# Patient Record
Sex: FEMALE | Race: WHITE | NOT HISPANIC OR LATINO | Employment: STUDENT | URBAN - METROPOLITAN AREA
[De-identification: names, ages, dates, MRNs, and addresses within clinical notes are randomized per-mention and may not be internally consistent; named-entity substitution may affect disease eponyms.]

---
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
## 2019-09-26 ENCOUNTER — TELEPHONE (OUTPATIENT)
Dept: OTHER | Facility: OTHER | Age: 19
End: 2019-09-26

## 2019-10-01 ENCOUNTER — OFFICE VISIT (OUTPATIENT)
Dept: FAMILY MEDICINE CLINIC | Facility: CLINIC | Age: 19
End: 2019-10-01
Payer: COMMERCIAL

## 2019-10-01 VITALS
SYSTOLIC BLOOD PRESSURE: 110 MMHG | HEIGHT: 63 IN | DIASTOLIC BLOOD PRESSURE: 68 MMHG | TEMPERATURE: 99 F | BODY MASS INDEX: 23.39 KG/M2 | OXYGEN SATURATION: 99 % | HEART RATE: 69 BPM | WEIGHT: 132 LBS

## 2019-10-01 DIAGNOSIS — N94.6 DYSMENORRHEA: ICD-10-CM

## 2019-10-01 DIAGNOSIS — N92.3 SPOTTING BETWEEN MENSES: Primary | ICD-10-CM

## 2019-10-01 PROCEDURE — 3008F BODY MASS INDEX DOCD: CPT | Performed by: FAMILY MEDICINE

## 2019-10-01 PROCEDURE — 99213 OFFICE O/P EST LOW 20 MIN: CPT | Performed by: FAMILY MEDICINE

## 2019-10-01 RX ORDER — NORETHINDRONE ACETATE AND ETHINYL ESTRADIOL 1MG-20(21)
1 KIT ORAL DAILY
Qty: 84 TABLET | Refills: 3 | Status: SHIPPED | OUTPATIENT
Start: 2019-10-01 | End: 2019-10-14 | Stop reason: SDUPTHER

## 2019-10-02 NOTE — PROGRESS NOTES
Assessment/Plan:      Diagnoses and all orders for this visit:    Spotting between menses  Comments:  change bcp    Dysmenorrhea  -     norethindrone-ethinyl estradiol (JUNEL FE 1/20) 1-20 MG-MCG per tablet; Take 1 tablet by mouth daily          Subjective:  Chief Complaint   Patient presents with    birth control     pt would like to talk about her birth control, pt states she's been bleeding with really bad cramps all month        Patient ID: Skip Baker is a 20 y o  female  Pt states she has been on same bcp for years but over the past month has had ongoing spotting/bleeding  And over the weekend had cramping  Would like to change bcp      Review of Systems   Constitutional: Negative  Negative for fatigue and fever  HENT: Negative  Eyes: Negative  Respiratory: Negative  Negative for cough  Cardiovascular: Negative  Gastrointestinal: Negative  Endocrine: Negative  Genitourinary: Positive for menstrual problem and vaginal bleeding  Negative for vaginal discharge and vaginal pain  Musculoskeletal: Negative  Skin: Negative  Allergic/Immunologic: Negative  Neurological: Negative  Psychiatric/Behavioral: Negative  The following portions of the patient's history were reviewed and updated as appropriate: allergies, current medications, past family history, past medical history, past social history, past surgical history and problem list     Objective:  Vitals:    10/01/19 1805   BP: 110/68   Pulse: 69   Temp: 99 °F (37 2 °C)   SpO2: 99%   Weight: 59 9 kg (132 lb)   Height: 5' 3" (1 6 m)      Physical Exam   Constitutional: She is oriented to person, place, and time  She appears well-developed and well-nourished  HENT:   Head: Normocephalic and atraumatic  Cardiovascular: Normal rate, regular rhythm, normal heart sounds and intact distal pulses  Pulmonary/Chest: Effort normal and breath sounds normal    Abdominal: Soft   Bowel sounds are normal    Neurological: She is alert and oriented to person, place, and time  Skin: Skin is warm and dry  Psychiatric: She has a normal mood and affect  Her behavior is normal  Judgment and thought content normal    Nursing note and vitals reviewed

## 2019-10-14 DIAGNOSIS — N94.6 DYSMENORRHEA: ICD-10-CM

## 2019-10-14 RX ORDER — NORETHINDRONE ACETATE AND ETHINYL ESTRADIOL 1MG-20(21)
1 KIT ORAL DAILY
Qty: 84 TABLET | Refills: 3 | Status: SHIPPED | OUTPATIENT
Start: 2019-10-14

## 2020-02-05 ENCOUNTER — OFFICE VISIT (OUTPATIENT)
Dept: FAMILY MEDICINE CLINIC | Facility: CLINIC | Age: 20
End: 2020-02-05
Payer: COMMERCIAL

## 2020-02-05 VITALS
BODY MASS INDEX: 23.79 KG/M2 | HEART RATE: 111 BPM | TEMPERATURE: 100.6 F | DIASTOLIC BLOOD PRESSURE: 64 MMHG | HEIGHT: 63 IN | SYSTOLIC BLOOD PRESSURE: 88 MMHG | WEIGHT: 134.25 LBS | OXYGEN SATURATION: 98 %

## 2020-02-05 DIAGNOSIS — J11.1 INFLUENZA: Primary | ICD-10-CM

## 2020-02-05 DIAGNOSIS — F41.9 ANXIETY: ICD-10-CM

## 2020-02-05 PROCEDURE — 99214 OFFICE O/P EST MOD 30 MIN: CPT | Performed by: FAMILY MEDICINE

## 2020-02-05 PROCEDURE — 1036F TOBACCO NON-USER: CPT | Performed by: FAMILY MEDICINE

## 2020-02-05 PROCEDURE — 3008F BODY MASS INDEX DOCD: CPT | Performed by: FAMILY MEDICINE

## 2020-02-05 RX ORDER — OSELTAMIVIR PHOSPHATE 75 MG/1
75 CAPSULE ORAL EVERY 12 HOURS SCHEDULED
Qty: 10 CAPSULE | Refills: 0 | Status: SHIPPED | OUTPATIENT
Start: 2020-02-05 | End: 2020-02-10

## 2020-02-05 RX ORDER — BUSPIRONE HYDROCHLORIDE 10 MG/1
10 TABLET ORAL 3 TIMES DAILY PRN
Qty: 30 TABLET | Refills: 0 | Status: SHIPPED | OUTPATIENT
Start: 2020-02-05

## 2020-02-05 NOTE — PATIENT INSTRUCTIONS
TAMIFLU 1 TAB TWICE A DAY  IBUPROFEN AS NEEDED  FLUIDS  BUSPIRONE 10MG 1TAB UP TO 3 TIMES A DAY AS NEEDED FOR ANXIETY     Influenza   AMBULATORY CARE:   Influenza  (the flu) is an infection caused by the influenza virus  The flu is easily spread when an infected person coughs, sneezes, or has close contact with others  You may be able to spread the flu to others for 1 week or longer after signs or symptoms appear  Common signs and symptoms include the following:   · Fever and chills    · Headaches, body aches, and muscle or joint pain    · Cough, runny nose, and sore throat    · Loss of appetite, nausea, vomiting, or diarrhea    · Tiredness    · Trouble breathing  Call 911 for any of the following:   · You have trouble breathing, and your lips look purple or blue  · You have a seizure  Seek care immediately if:   · You are dizzy, or you are urinating less or not at all  · You have a headache with a stiff neck, and you feel tired or confused  · You have new pain or pressure in your chest     · Your symptoms, such as shortness of breath, vomiting, or diarrhea, get worse  · Your symptoms, such as fever and coughing, seem to get better, but then get worse  Contact your healthcare provider if:   · You have new muscle pain or weakness  · You have questions or concerns about your condition or care  Treatment for influenza  may include any of the following:  · Acetaminophen  decreases pain and fever  It is available without a doctor's order  Ask how much to take and how often to take it  Follow directions  Acetaminophen can cause liver damage if not taken correctly  · NSAIDs , such as ibuprofen, help decrease swelling, pain, and fever  This medicine is available with or without a doctor's order  NSAIDs can cause stomach bleeding or kidney problems in certain people  If you take blood thinner medicine, always ask your healthcare provider if NSAIDs are safe for you   Always read the medicine label and follow directions  · Antivirals  help fight a viral infection  Manage your symptoms:   · Rest  as much as you can to help you recover  · Drink liquids as directed  to help prevent dehydration  Ask how much liquid to drink each day and which liquids are best for you  Prevent the spread of the flu:   · Wash your hands often  Use soap and water  Wash your hands after you use the bathroom, change a child's diapers, or sneeze  Wash your hands before you prepare or eat food  Use gel hand cleanser when soap and water are not available  Do not touch your eyes, nose, or mouth unless you have washed your hands first            · Cover your mouth when you sneeze or cough  Cough into a tissue or the bend of your arm  · Clean shared items with a germ-killing   Clean table surfaces, doorknobs, and light switches  Do not share towels, silverware, and dishes with people who are sick  Wash bed sheets, towels, silverware, and dishes with soap and water  · Wear a mask  over your mouth and nose if you are sick or are near anyone who is sick  · Stay away from others  if you are sick  · Influenza vaccine  helps prevent influenza (flu)  Everyone older than 6 months should get a yearly influenza vaccine  Get the vaccine as soon as it is available, usually in September or October each year  Follow up with your healthcare provider as directed:  Write down your questions so you remember to ask them during your visits  © 2017 [employer] Information is for End User's use only and may not be sold, redistributed or otherwise used for commercial purposes  All illustrations and images included in CareNotes® are the copyrighted property of [employer]  or [employer]  The above information is an  only  It is not intended as medical advice for individual conditions or treatments   Talk to your doctor, nurse or pharmacist before following any medical regimen to see if it is safe and effective for you

## 2020-02-05 NOTE — LETTER
February 5, 2020     Patient: [place]   YOB: 2000   Date of Visit: 2/5/2020       To Whom it May Concern:    [place] is under my professional care  She was seen in my office on 2/5/2020  She may return to work on 2/11/2020  If you have any questions or concerns, please don't hesitate to call           Sincerely,          Michael Schreiber,         CC: No Recipients

## 2020-02-07 NOTE — PROGRESS NOTES
Assessment/Plan:      Diagnoses and all orders for this visit:    Influenza  Comments:  start tamiflu 1 tab twice a day, ibuprofen as needed  Orders:  -     oseltamivir (TAMIFLU) 75 mg capsule; Take 1 capsule (75 mg total) by mouth every 12 (twelve) hours for 5 days    Anxiety  Comments:  start trial of buspar 1 tab 3 times a day as needed  Orders:  -     busPIRone (BUSPAR) 10 mg tablet; Take 1 tablet (10 mg total) by mouth 3 (three) times a day as needed (ANXIETY)          Subjective:  Chief Complaint   Patient presents with    Fever     PT COMES IN TODAY WITH FLU LIKE SX'S  COUGHING, HEADACHE/PAIN, ACHY - PAIN ALL OVER, SWEATS, FEVER  PT 'S SISTER DX'D WITH FLU ON MONDAY  PT DUE TO GO TO [place] THIS WEEKEND FOR WEDDING  Patient ID: Martita Wright is a 20 y o  female  Pt started with symptoms 2 days ago  Fever up to 101, achiness, dry cough, head congestion, fatigue  Also complains of anxiety for many years but worse more recently  Intermittently, no particluar situation can come on at any time, even when riding in a car  Review of Systems   Constitutional: Positive for activity change, appetite change, chills, fatigue and fever  HENT: Positive for congestion, ear pain, postnasal drip and sore throat  Eyes: Negative  Respiratory: Positive for cough  Negative for shortness of breath  Cardiovascular: Negative  Gastrointestinal: Negative  Endocrine: Negative  Genitourinary: Negative  Musculoskeletal: Positive for arthralgias and myalgias  Skin: Negative  Allergic/Immunologic: Negative  Neurological: Negative  Psychiatric/Behavioral: Negative for dysphoric mood  The patient is nervous/anxious            The following portions of the patient's history were reviewed and updated as appropriate: allergies, current medications, past family history, past medical history, past social history, past surgical history and problem list     Objective:  Vitals:    02/05/20 1508   BP: (!) 88/64   Pulse: (!) 111   Temp: (!) 100 6 °F (38 1 °C)   SpO2: 98%   Weight: 60 9 kg (134 lb 4 oz)   Height: 5' 3" (1 6 m)      Physical Exam   Constitutional: She is oriented to person, place, and time  She appears well-developed and well-nourished  HENT:   Head: Normocephalic and atraumatic  Right Ear: External ear normal    Left Ear: External ear normal    Nasal congestion without erythema   Eyes: Conjunctivae are normal    Cardiovascular: Normal rate, regular rhythm, normal heart sounds and intact distal pulses  Pulmonary/Chest: Effort normal and breath sounds normal    Abdominal: Soft  Bowel sounds are normal    Neurological: She is alert and oriented to person, place, and time  Skin: Skin is warm and dry  Psychiatric: She has a normal mood and affect  Her behavior is normal  Judgment and thought content normal    Nursing note and vitals reviewed